# Patient Record
Sex: FEMALE | Race: AMERICAN INDIAN OR ALASKA NATIVE
[De-identification: names, ages, dates, MRNs, and addresses within clinical notes are randomized per-mention and may not be internally consistent; named-entity substitution may affect disease eponyms.]

---

## 2018-08-21 ENCOUNTER — HOSPITAL ENCOUNTER (OUTPATIENT)
Dept: HOSPITAL 88 - ER | Age: 68
Setting detail: OBSERVATION
LOS: 2 days | Discharge: HOME | End: 2018-08-23
Attending: INTERNAL MEDICINE | Admitting: INTERNAL MEDICINE
Payer: MEDICARE

## 2018-08-21 VITALS — SYSTOLIC BLOOD PRESSURE: 179 MMHG | DIASTOLIC BLOOD PRESSURE: 77 MMHG

## 2018-08-21 VITALS — BODY MASS INDEX: 29.03 KG/M2 | HEIGHT: 59 IN | WEIGHT: 144 LBS

## 2018-08-21 DIAGNOSIS — Z91.040: ICD-10-CM

## 2018-08-21 DIAGNOSIS — Z88.8: ICD-10-CM

## 2018-08-21 DIAGNOSIS — J91.0: ICD-10-CM

## 2018-08-21 DIAGNOSIS — E87.6: ICD-10-CM

## 2018-08-21 DIAGNOSIS — I10: ICD-10-CM

## 2018-08-21 DIAGNOSIS — Z88.5: ICD-10-CM

## 2018-08-21 DIAGNOSIS — J44.9: ICD-10-CM

## 2018-08-21 DIAGNOSIS — Z88.6: ICD-10-CM

## 2018-08-21 DIAGNOSIS — D63.8: ICD-10-CM

## 2018-08-21 DIAGNOSIS — C34.92: Primary | ICD-10-CM

## 2018-08-21 DIAGNOSIS — Z88.1: ICD-10-CM

## 2018-08-21 DIAGNOSIS — Z87.891: ICD-10-CM

## 2018-08-21 DIAGNOSIS — Z88.0: ICD-10-CM

## 2018-08-21 DIAGNOSIS — Z94.4: ICD-10-CM

## 2018-08-21 DIAGNOSIS — R06.00: ICD-10-CM

## 2018-08-21 LAB
ALBUMIN SERPL-MCNC: 2.2 G/DL (ref 3.5–5)
ALBUMIN/GLOB SERPL: 0.7 {RATIO} (ref 0.8–2)
ALP SERPL-CCNC: 98 IU/L (ref 40–150)
ALT SERPL-CCNC: 12 IU/L (ref 0–55)
ANION GAP SERPL CALC-SCNC: 14.5 MMOL/L (ref 8–16)
BASOPHILS # BLD AUTO: 0 10*3/UL (ref 0–0.1)
BASOPHILS NFR BLD AUTO: 0.1 % (ref 0–1)
BNP BLD-MCNC: 86.6 PG/ML (ref 0–100)
BUN SERPL-MCNC: 12 MG/DL (ref 7–26)
BUN/CREAT SERPL: 16 (ref 6–25)
CALCIUM SERPL-MCNC: 8.5 MG/DL (ref 8.4–10.2)
CHLORIDE SERPL-SCNC: 107 MMOL/L (ref 98–107)
CK MB SERPL-MCNC: 0.8 NG/ML (ref 0–5)
CK SERPL-CCNC: 31 IU/L (ref 29–168)
CO2 SERPL-SCNC: 22 MMOL/L (ref 22–29)
DEPRECATED APTT PLAS QN: 26.4 SECONDS (ref 23.8–35.5)
DEPRECATED INR PLAS: 1.03
DEPRECATED NEUTROPHILS # BLD AUTO: 7.7 10*3/UL (ref 2.1–6.9)
EGFRCR SERPLBLD CKD-EPI 2021: > 60 ML/MIN (ref 60–?)
EOSINOPHIL # BLD AUTO: 0.1 10*3/UL (ref 0–0.4)
EOSINOPHIL NFR BLD AUTO: 0.6 % (ref 0–6)
ERYTHROCYTE [DISTWIDTH] IN CORD BLOOD: 18.8 % (ref 11.7–14.4)
GLOBULIN PLAS-MCNC: 3.2 G/DL (ref 2.3–3.5)
GLUCOSE SERPLBLD-MCNC: 125 MG/DL (ref 74–118)
HCT VFR BLD AUTO: 33 % (ref 34.2–44.1)
HGB BLD-MCNC: 10.4 G/DL (ref 12–16)
LYMPHOCYTES # BLD: 0.9 10*3/UL (ref 1–3.2)
LYMPHOCYTES NFR BLD AUTO: 9.4 % (ref 18–39.1)
MAGNESIUM SERPL-MCNC: 1.5 MG/DL (ref 1.3–2.1)
MCH RBC QN AUTO: 22.4 PG (ref 28–32)
MCHC RBC AUTO-ENTMCNC: 31.5 G/DL (ref 31–35)
MCV RBC AUTO: 71.1 FL (ref 81–99)
MONOCYTES # BLD AUTO: 0.6 10*3/UL (ref 0.2–0.8)
MONOCYTES NFR BLD AUTO: 6.5 % (ref 4.4–11.3)
NEUTS SEG NFR BLD AUTO: 83 % (ref 38.7–80)
PLATELET # BLD AUTO: 320 X10E3/UL (ref 140–360)
POTASSIUM SERPL-SCNC: 3.5 MMOL/L (ref 3.5–5.1)
PROTHROMBIN TIME: 12.7 SECONDS (ref 11.9–14.5)
RBC # BLD AUTO: 4.64 X10E6/UL (ref 3.6–5.1)
SODIUM SERPL-SCNC: 140 MMOL/L (ref 136–145)

## 2018-08-21 PROCEDURE — 82553 CREATINE MB FRACTION: CPT

## 2018-08-21 PROCEDURE — 85730 THROMBOPLASTIN TIME PARTIAL: CPT

## 2018-08-21 PROCEDURE — 83880 ASSAY OF NATRIURETIC PEPTIDE: CPT

## 2018-08-21 PROCEDURE — 83615 LACTATE (LD) (LDH) ENZYME: CPT

## 2018-08-21 PROCEDURE — 84484 ASSAY OF TROPONIN QUANT: CPT

## 2018-08-21 PROCEDURE — 85610 PROTHROMBIN TIME: CPT

## 2018-08-21 PROCEDURE — 36415 COLL VENOUS BLD VENIPUNCTURE: CPT

## 2018-08-21 PROCEDURE — 88112 CYTOPATH CELL ENHANCE TECH: CPT

## 2018-08-21 PROCEDURE — 88305 TISSUE EXAM BY PATHOLOGIST: CPT

## 2018-08-21 PROCEDURE — 85025 COMPLETE CBC W/AUTO DIFF WBC: CPT

## 2018-08-21 PROCEDURE — 71045 X-RAY EXAM CHEST 1 VIEW: CPT

## 2018-08-21 PROCEDURE — 97116 GAIT TRAINING THERAPY: CPT

## 2018-08-21 PROCEDURE — 71046 X-RAY EXAM CHEST 2 VIEWS: CPT

## 2018-08-21 PROCEDURE — 93005 ELECTROCARDIOGRAM TRACING: CPT

## 2018-08-21 PROCEDURE — 83735 ASSAY OF MAGNESIUM: CPT

## 2018-08-21 PROCEDURE — 32555 ASPIRATE PLEURA W/ IMAGING: CPT

## 2018-08-21 PROCEDURE — 84157 ASSAY OF PROTEIN OTHER: CPT

## 2018-08-21 PROCEDURE — 83605 ASSAY OF LACTIC ACID: CPT

## 2018-08-21 PROCEDURE — 94640 AIRWAY INHALATION TREATMENT: CPT

## 2018-08-21 PROCEDURE — 99284 EMERGENCY DEPT VISIT MOD MDM: CPT

## 2018-08-21 PROCEDURE — 80053 COMPREHEN METABOLIC PANEL: CPT

## 2018-08-21 PROCEDURE — 89051 BODY FLUID CELL COUNT: CPT

## 2018-08-21 PROCEDURE — 82550 ASSAY OF CK (CPK): CPT

## 2018-08-21 PROCEDURE — 74470 X-RAY EXAM OF KIDNEY LESION: CPT

## 2018-08-21 PROCEDURE — 97161 PT EVAL LOW COMPLEX 20 MIN: CPT

## 2018-08-21 RX ADMIN — OXYCODONE HYDROCHLORIDE AND ACETAMINOPHEN PRN EACH: 5; 325 TABLET ORAL at 21:50

## 2018-08-21 NOTE — DIAGNOSTIC IMAGING REPORT
EXAMINATION: PA and lateral views of the chest.



COMPARISON: None



CLINICAL HISTORY:  Shortness of breath, history of lower lobectomy

     

DISCUSSION:



Lines/tubes:  Right upper chest Port-A-Cath, with the catheter tip projecting

in the distal SVC.. A radiopaque drainage catheter projects in the left lower

hemithorax



Lungs and pleura:  Lungs are well-inflated. There is layering opacification of

the left hemithorax, consistent with a large pleural effusion and likely

associated compressive atelectasis. The right lung is clear..



Heart and mediastinum:  Cardiac silhouette is obscured.  Right pulmonary

vasculature is normal.



Bones and soft tissues:  No acute bony abnormalities.  Degenerative changes in

the thoracic spine



IMPRESSION: 

Findings likely represent large pleural effusion and likely associated

compressive atelectasis. This may also represent post lobectomy pneumothorax or

chylothorax, if there is history of recent surgery











Signed by: Dr. Jeff Brownlee M.D. on 8/21/2018 4:28 PM

## 2018-08-22 VITALS — SYSTOLIC BLOOD PRESSURE: 180 MMHG | DIASTOLIC BLOOD PRESSURE: 77 MMHG

## 2018-08-22 VITALS — SYSTOLIC BLOOD PRESSURE: 170 MMHG | DIASTOLIC BLOOD PRESSURE: 77 MMHG

## 2018-08-22 VITALS — SYSTOLIC BLOOD PRESSURE: 192 MMHG | DIASTOLIC BLOOD PRESSURE: 75 MMHG

## 2018-08-22 VITALS — DIASTOLIC BLOOD PRESSURE: 76 MMHG | SYSTOLIC BLOOD PRESSURE: 145 MMHG

## 2018-08-22 VITALS — SYSTOLIC BLOOD PRESSURE: 145 MMHG | DIASTOLIC BLOOD PRESSURE: 76 MMHG

## 2018-08-22 VITALS — DIASTOLIC BLOOD PRESSURE: 66 MMHG | SYSTOLIC BLOOD PRESSURE: 145 MMHG

## 2018-08-22 VITALS — SYSTOLIC BLOOD PRESSURE: 187 MMHG | DIASTOLIC BLOOD PRESSURE: 83 MMHG

## 2018-08-22 VITALS — SYSTOLIC BLOOD PRESSURE: 170 MMHG | DIASTOLIC BLOOD PRESSURE: 74 MMHG

## 2018-08-22 LAB
APPEARANCE FLD: (no result)
COLOR FLD: YELLOW
DEPRECATED LYMPHOCYTES FR FLD MANUAL: 60 %
EOSINOPHIL NFR FLD: 2 %
MONOS+MACROS NFR FLD MANUAL: 7 %
NEUTROPHILS NFR FLD MANUAL: 11 %
OTHER CELLS FLD MANUAL: 20 %
SPECIMEN SOURCE FLD: (no result)
WBC # FLD MANUAL: 297 CELLS/UL

## 2018-08-22 RX ADMIN — OXYCODONE HYDROCHLORIDE AND ACETAMINOPHEN PRN EACH: 5; 325 TABLET ORAL at 10:10

## 2018-08-22 RX ADMIN — IPRATROPIUM BROMIDE AND ALBUTEROL SULFATE SCH ML: .5; 2.5 SOLUTION RESPIRATORY (INHALATION) at 13:00

## 2018-08-22 RX ADMIN — BUDESONIDE SCH ML: 0.5 SUSPENSION RESPIRATORY (INHALATION) at 19:00

## 2018-08-22 RX ADMIN — AMLODIPINE BESYLATE SCH MG: 5 TABLET ORAL at 08:49

## 2018-08-22 RX ADMIN — OXYCODONE HYDROCHLORIDE AND ACETAMINOPHEN PRN EACH: 5; 325 TABLET ORAL at 01:50

## 2018-08-22 RX ADMIN — IPRATROPIUM BROMIDE AND ALBUTEROL SULFATE SCH ML: .5; 2.5 SOLUTION RESPIRATORY (INHALATION) at 19:00

## 2018-08-22 RX ADMIN — METOPROLOL TARTRATE SCH MG: 25 TABLET, FILM COATED ORAL at 16:12

## 2018-08-22 RX ADMIN — Medication SCH MG: at 08:48

## 2018-08-22 RX ADMIN — OXYCODONE HYDROCHLORIDE AND ACETAMINOPHEN PRN EACH: 5; 325 TABLET ORAL at 15:50

## 2018-08-22 RX ADMIN — TACROLIMUS SCH MG: 1 CAPSULE, GELATIN COATED ORAL at 10:15

## 2018-08-22 RX ADMIN — METOPROLOL TARTRATE SCH MG: 25 TABLET, FILM COATED ORAL at 08:48

## 2018-08-22 RX ADMIN — OXYCODONE HYDROCHLORIDE AND ACETAMINOPHEN PRN EACH: 5; 325 TABLET ORAL at 20:10

## 2018-08-22 RX ADMIN — ASPIRIN 81 MG CHEWABLE TABLET SCH MG: 81 TABLET CHEWABLE at 08:48

## 2018-08-22 RX ADMIN — OXYCODONE HYDROCHLORIDE AND ACETAMINOPHEN PRN EACH: 5; 325 TABLET ORAL at 06:06

## 2018-08-22 NOTE — CONSULTATION
DATE OF CONSULTATION:  August 21, 2018 



PULMONARY CONSULTATION



REASON FOR THE CONSULT:  Large left pleural effusion. 



CHIEF COMPLAINT:  Shortness of breath.



HPI:  Ms. Mejias is a 67-year-old female.  She lives in [place], [place] and was diagnosed with lung cancer with malignant pleural 

effusion.  She has an indwelling pleural catheter placed; however, she 

forgot to bring the drainage kit with her.  She came to [place] to be 

treated at [facility] and she has already seen Dr. Conteh at [facility] for 

her lung cancer.  She reports that she underwent lobectomy and after 

lobectomy, she possibly had a recurrence and now there are cancer cells and 

pleural effusion according to her.  She denies any chest pain.  She was 

having shortness of breath in the emergency room.  Some of the pleural 

fluid was drained by the emergency room physician.  She denies any chest 

pain, nausea, vomiting.



REVIEW OF SYSTEMS

GENERAL:  Denies any fever, chills.  

HEAD:  Denies any head trauma.  

ENT:  Denies any earache.  

CVS:  Denies any chest pain.  

RESPIRATORY:  Shortness of breath. 

GI:  Denies any nausea, vomiting.  

REST OF THE REVIEW SYSTEMS:  Negative except as in HPI. 



PAST MEDICAL HISTORY

1. Status post liver transplant in 2015.  Patient has alcoholic liver 

cirrhosis.  She quit drinking for a year and underwent liver transplant. 

 She is on Prograf.  

2. Lung cancer, she has right-sided pleural effusion, which according to 

the patient is malignant and that has been tested.

3. Hypertension.



PAST SURGICAL HISTORY:  Cholecystectomy, appendectomy, incisional hernia 

surgery, liver transplant surgery, lobectomy, cholecystectomy.



FAMILY HISTORY AND SOCIAL HISTORY:  She quit smoking 3 months ago, smoked 

cigarettes since she was 14 years old.  She smoked for 50 years.  She quit 

drinking in 2014.  She he lives in [place], [place], and all her 

care was at [place], [place].  Now, she has moved to [place] to be 

treated at [facility]. 

 

PHYSICAL EXAM   

VITAL SIGNS:  Temperature 98.6, pulse of 85, blood pressure 167/82, he has 

respiratory rate of 18.  O2 sat 95%. 

HEENT:  Head atraumatic, normocephalic.  

NECK:  Supple.  

CHEST:  Clear to auscultation bilaterally.  Reduced air entry on the right 

side.  

HEART:  S1, S2 audible.  

ABDOMEN:  Soft, nontender. 

EXTREMITIES:  Pedal edema.  

NEUROLOGIC:  Awake and alert.  No focal neurologic deficit. 



LABS:  White count of 9000, hemoglobin 10.4, platelets 320,000.  Chemistry, 

sodium 140, potassium 3.5, chloride 107, BUN 12, creatinine 0.7.  Chest 

x-ray showing large right-sided pleural effusion.



ASSESSMENT AND PLAN:  Ms. Mejias is a 67-year-old female with lung cancer. 

 She was diagnosed with malignant pleural effusion and has a PleurX 

catheter; however, she forgot to bring her supplies and has came to the 

emergency room with shortness of breath.





CURRENT PROBLEMS

1. Lung cancer.

2. Malignant pleural effusion.

3. History of liver transplant.

4. Ex-smoker.

5. Chronic obstructive pulmonary disease. 



PLAN

1. We will put a case management consult to find a [employer] company to 

provide her with supplies of her PleurX catheter.  

2. Will start the patient on nebulizer treatment for her COPD.  

3. Currently, some fluid was drained by ER physician and patient is 

feeling comfortable.  If further drainage is needed and the catheter is 

not working, she will have to have conventional thoracentesis.  



Thank you for this consult.









DD:  08/21/2018 19:40

DT:  08/21/2018 20:47

Job#:  [id number]

## 2018-08-22 NOTE — HISTORY AND PHYSICAL
HPI:  She is a 67-year-old female with past medical history positive for 

adenocarcinoma of the lung stage IV, status post chemotherapy, status post 

partial lobectomy in the left lung, history of liver transplant secondary 

to cirrhosis, came here because she could not breathe.  She was found to 

have large pleural effusion.  She has a left pleural catheter.  She 

apparently has not brought the kit that comes with the catheter, so 

thoracentesis had to be done by Dr. Oliver.  Patient is feeling better 

right now.



REVIEW OF SYSTEMS

CARDIOVASCULAR:  No chest pain, no palpitation.

RESPIRATORY:  She has shortness of breath.  No cough.

GASTROINTESTINAL:  No nausea, no vomiting, no diarrhea.

GENITOURINARY:  No frequency, no dysuria.



ALLERGIES:  SHE IS ALLERGIC TO LATEX, LEVAQUIN, ZOFRAN, DILAUDID, TYLENOL, 

HYDROCODONE, CODEINE, AND PENICILLIN.



SOCIAL HISTORY:  She quit smoking 3 months ago.  She used to drink, she 

does not drink anymore.



PAST MEDICAL HISTORY:  Positive for adenocarcinoma of the left lung, status 

post lobectomy, status post chemotherapy, status post PleurX catheter 

placed in the left lung.  Also, she has history of hypertension.



PHYSICAL EXAMINATION

VITAL SIGNS:  Blood pressure 170/77, temperature 97.7, heart rate 87 per 

minute, respiratory rate 18 per minute, oxygen saturation 95%.

HEART:  Regular rhythm.  Normal S1, S2 sound.

LUNGS:  Show decreased breath sounds on the left lung.  Clear to 

auscultation in the right lung.

ABDOMEN:  Soft.

EXTREMITIES:  No evidence of cyanosis, edema, or trauma.



LABORATORY DATA:  On the BMP; sodium 140, potassium 3.5, chloride 107, CO2 

of 22, BUN 12, creatinine 0.74, glucose 125.  On the CBC; white blood count 

9.34, hemoglobin 10.4, hematocrit 33.0, platelet count 320,000.  PT 12.7 

and PTT 26.4, INR 1.03.  AST 14, ALT 12, total bilirubin 0.1, alkaline 

phosphatase 98.



FINAL IMPRESSION

1. Left pleural effusion secondary to adenocarcinoma of the lung.

2. Anemia of chronic disease.

3. Adenocarcinoma of the lung stage IV, status post pneumonectomy.

4. Hypokalemia.

5. Status post liver transplant.



PLAN OF TREATMENT:  Dr. Oliver, pulmonology, has been consulted on the 

case.  He has done thoracentesis.  Patient is trying to get the kit for the 

Pleur-evac that he has.  We are going to continue metoprolol 25 mg twice a 

day.  We are going to start lisinopril 20 mg daily, Norvasc 5 mg daily, 

hydralazine 20 mg q.4 hours as needed, albuterol and Atrovent q.4 hours as 

needed for shortness of breath, budesonide 1 inhalation twice a day, 

aspirin 81 mg daily, Norco 1 tablet q.4 hours as needed for pain, 

tacrolimus 1 mg at bedtime and 2 mg daily.  Patient will follow up at [facility].









DD:  08/22/2018 10:45

DT:  08/22/2018 11:00

Job#:  [id number]

## 2018-08-22 NOTE — DIAGNOSTIC IMAGING REPORT
PROCEDURE:   ULTRASOUND GUIDED LEFT DIAGNOSTIC AND THERAPEUTIC 

THORACENTESIS

 

INDICATIONS:      PLEURAL EFFUSIONS

 

COMPARISON:     Chest radiograph 8/21/18.

 

TECHNIQUE:

The patient was informed of the nature of the proposed procedure.  The 

purposes, alternatives, risks, and benefits were explained and 

discussed.  All questions were answered and written consent was 

obtained.

 

Medications:  1% Xylocaine 

 

DESCRIPTION/FINDINGS:  Informed consent was obtained from the patient.  

Sterile technique was used.  

 

Preliminary ultrasound demonstrated a moderate left pleural fluid 

collection.  After infiltrating the skin with 1% xylocaine, a 5 Fr 

catheter was advanced into the left pleural space with ultrasound 

guidance and 350 cc of serous fluid was removed.  The catheter was 

removed without immediate complication. Sterile bandage was placed. A 

chest radiograph was ordered.

 

Samples were sent for analysis.

 

IMPRESSION:

 

Ultrasound guided left sided diagnostic and therapeutic thoracentesis. 

 

Dictated by:  BOBBY ROSARIO M.D. on 8/22/2018 at 12:19     

Electronically approved by:  BOBBY ROSARIO M.D. on 8/22/2018 at 12:19

## 2018-08-22 NOTE — DIAGNOSTIC IMAGING REPORT
This report includes an Addendum and supersedes previous reports for 

this exam.

 

 

 

PROCEDURE: CHEST XRAY POST PROCEDURE

COMPARISON: Chest radiograph 8/21/18. 

INDICATIONS: POST THORACENTESIS

 

FINDINGS:



Lines/tubes: Right upper chest Port-A-Cath, with the catheter tip 

projecting

in the distal SVC. A PleurX catheter projects over the left base. 

 

Lungs and pleura: Lungs are well-inflated. Interval decrease in size of 

a left sided pleural effusion, now small. There is opacification of the 

left hemithorax with prominence of the left hilum. There is thickening 

of the left pleura. The right lung is clear without evidence of 

pneumonia or pulmonary edema.

 

Heart and mediastinum: Cardiac silhouette is obscured. Right pulmonary

vasculature is normal.

 

Bones and soft tissues: No acute bony abnormalities. Degenerative 

changes in

the thoracic spine

 

IMPRESSION:

Interval thoracentesis on the left with decreased left pleural effusion 

and decreased compressive atelectasis. No evidence of pneumothorax. 

 

Opacification of the left hemithorax with prominence of the left hilum 

and pleural thickening, in this patient with known lung malignancy. 

Correlation with prior outside imaging would be helpful for comparison. 

 

 

Dictated by:  BOBBY ROSARIO M.D. on 8/22/2018 at 12:17     

Electronically approved by:  BOBBY ROSARIO M.D. on 8/22/2018 at 12:17   

 

 

ADDENDUM: 

 

No evidence of pneumothorax post thoracentesis. 

 

Dictated by:  BOBBY ROSARIO M.D. on 8/22/2018 at 12:20     

Electronically approved by:  BOBBY ROSARIO M.D. on 8/22/2018 at 12:20

## 2018-08-23 VITALS — DIASTOLIC BLOOD PRESSURE: 65 MMHG | SYSTOLIC BLOOD PRESSURE: 133 MMHG

## 2018-08-23 VITALS — SYSTOLIC BLOOD PRESSURE: 125 MMHG | DIASTOLIC BLOOD PRESSURE: 63 MMHG

## 2018-08-23 VITALS — DIASTOLIC BLOOD PRESSURE: 76 MMHG | SYSTOLIC BLOOD PRESSURE: 175 MMHG

## 2018-08-23 RX ADMIN — METOPROLOL TARTRATE SCH MG: 25 TABLET, FILM COATED ORAL at 08:16

## 2018-08-23 RX ADMIN — BUDESONIDE SCH ML: 0.5 SUSPENSION RESPIRATORY (INHALATION) at 07:00

## 2018-08-23 RX ADMIN — OXYCODONE HYDROCHLORIDE AND ACETAMINOPHEN PRN EACH: 5; 325 TABLET ORAL at 08:45

## 2018-08-23 RX ADMIN — ASPIRIN 81 MG CHEWABLE TABLET SCH MG: 81 TABLET CHEWABLE at 08:16

## 2018-08-23 RX ADMIN — TACROLIMUS SCH MG: 1 CAPSULE, GELATIN COATED ORAL at 08:17

## 2018-08-23 RX ADMIN — IPRATROPIUM BROMIDE AND ALBUTEROL SULFATE SCH ML: .5; 2.5 SOLUTION RESPIRATORY (INHALATION) at 01:00

## 2018-08-23 RX ADMIN — Medication SCH MG: at 08:16

## 2018-08-23 RX ADMIN — HYDRALAZINE HYDROCHLORIDE PRN MG: 20 INJECTION INTRAMUSCULAR; INTRAVENOUS at 02:23

## 2018-08-23 RX ADMIN — OXYCODONE HYDROCHLORIDE AND ACETAMINOPHEN PRN EACH: 5; 325 TABLET ORAL at 00:03

## 2018-08-23 RX ADMIN — HYDRALAZINE HYDROCHLORIDE PRN MG: 20 INJECTION INTRAMUSCULAR; INTRAVENOUS at 04:16

## 2018-08-23 RX ADMIN — AMLODIPINE BESYLATE SCH MG: 5 TABLET ORAL at 08:16

## 2018-08-23 RX ADMIN — OXYCODONE HYDROCHLORIDE AND ACETAMINOPHEN PRN EACH: 5; 325 TABLET ORAL at 04:30

## 2018-08-23 RX ADMIN — IPRATROPIUM BROMIDE AND ALBUTEROL SULFATE SCH ML: .5; 2.5 SOLUTION RESPIRATORY (INHALATION) at 07:00

## 2018-08-23 NOTE — PROGRESS NOTE
DATE:    



The patient is feeling better.  Last night, blood pressure was high.  

Hydralazine was given.  Status post thoracentesis yesterday.



PHYSICAL EXAMINATION

VITAL SINGS:  Temperature 96.8, pulse of 73, blood pressure 125/63, 

respiratory rate of 18, and O2 sat 95%.

HEENT:  Atraumatic, normocephalic.

NECK:  Supple.

CHEST:  Better air entry bilaterally.

HEART:  S1 and S2 audible.

ABDOMEN:  Soft.

EXTREMITIES:  No pedal edema.

NEUROLOGICAL: Awake and alert.



LABS:  Sodium 140, potassium 3.5, chloride 107, BUN 12, and creatinine 0.7, 

this was on August 21, 2018.  No new labs.



ASSESSMENT/PLAN:  Ms. Mejias is a 67-year-old female who has large pleural 

effusion status post thoracentesis.  Patient has indwelling pleural 

catheter, also has history of stage 4 adenocarcinoma of the lung.





CURRENT PROBLEM

1. Adenocarcinoma of the lung.  Dr. Che just called me the patient's 

pleural fluid is positive for malignant cell, hence stage 4.  Patient is 

following up at [facility].  Status post liver transplant program has 

been started.

2. Hypertension.  Continue the patient on antihypertensive medications.

3. Ex-smoker, quit 3 months ago, likely has COPD.  I have started the 

patient on Pulmicort nebulizer treatment and she will follow up with me 

as an outpatient.

4. Discussed with RN and discussed with case management.  Patient will 

get the supplies for her indwelling pleural catheter to do pleural fluid 

drainage at home.









DD:  08/23/2018 16:02

DT:  08/23/2018 16:14

Job#:  [id number]

## 2018-08-27 ENCOUNTER — HOSPITAL ENCOUNTER (INPATIENT)
Dept: HOSPITAL 88 - ER | Age: 68
LOS: 2 days | Discharge: HOME | DRG: 180 | End: 2018-08-29
Attending: INTERNAL MEDICINE | Admitting: INTERNAL MEDICINE
Payer: MEDICARE

## 2018-08-27 VITALS — WEIGHT: 139.06 LBS | HEIGHT: 59 IN | BODY MASS INDEX: 28.04 KG/M2

## 2018-08-27 DIAGNOSIS — R09.02: ICD-10-CM

## 2018-08-27 DIAGNOSIS — Z94.4: ICD-10-CM

## 2018-08-27 DIAGNOSIS — D63.8: ICD-10-CM

## 2018-08-27 DIAGNOSIS — J91.0: ICD-10-CM

## 2018-08-27 DIAGNOSIS — Z87.891: ICD-10-CM

## 2018-08-27 DIAGNOSIS — J18.9: ICD-10-CM

## 2018-08-27 DIAGNOSIS — C34.92: Primary | ICD-10-CM

## 2018-08-27 DIAGNOSIS — R18.8: ICD-10-CM

## 2018-08-27 LAB
ALBUMIN SERPL-MCNC: 2.2 G/DL (ref 3.5–5)
ALBUMIN/GLOB SERPL: 0.7 {RATIO} (ref 0.8–2)
ALP SERPL-CCNC: 93 IU/L (ref 40–150)
ALT SERPL-CCNC: 9 IU/L (ref 0–55)
ANION GAP SERPL CALC-SCNC: 12.9 MMOL/L (ref 8–16)
APPEARANCE FLD: (no result)
BASOPHILS # BLD AUTO: 0 10*3/UL (ref 0–0.1)
BASOPHILS NFR BLD AUTO: 0.4 % (ref 0–1)
BUN SERPL-MCNC: 18 MG/DL (ref 7–26)
BUN/CREAT SERPL: 20 (ref 6–25)
CALCIUM SERPL-MCNC: 8.6 MG/DL (ref 8.4–10.2)
CHLORIDE SERPL-SCNC: 106 MMOL/L (ref 98–107)
CO2 SERPL-SCNC: 22 MMOL/L (ref 22–29)
COLOR FLD: (no result)
DEPRECATED LYMPHOCYTES FR FLD MANUAL: 68 %
DEPRECATED NEUTROPHILS # BLD AUTO: 6.3 10*3/UL (ref 2.1–6.9)
EGFRCR SERPLBLD CKD-EPI 2021: > 60 ML/MIN (ref 60–?)
EOSINOPHIL # BLD AUTO: 0.1 10*3/UL (ref 0–0.4)
EOSINOPHIL NFR BLD AUTO: 1 % (ref 0–6)
ERYTHROCYTE [DISTWIDTH] IN CORD BLOOD: 18.6 % (ref 11.7–14.4)
GLOBULIN PLAS-MCNC: 3 G/DL (ref 2.3–3.5)
GLUCOSE FLD-MCNC: 111 MG/DL
GLUCOSE SERPLBLD-MCNC: 115 MG/DL (ref 74–118)
HCT VFR BLD AUTO: 32 % (ref 34.2–44.1)
HGB BLD-MCNC: 9.9 G/DL (ref 12–16)
LYMPHOCYTES # BLD: 1.1 10*3/UL (ref 1–3.2)
LYMPHOCYTES NFR BLD AUTO: 13.6 % (ref 18–39.1)
MCH RBC QN AUTO: 22.3 PG (ref 28–32)
MCHC RBC AUTO-ENTMCNC: 30.9 G/DL (ref 31–35)
MCV RBC AUTO: 72.2 FL (ref 81–99)
MONOCYTES # BLD AUTO: 0.7 10*3/UL (ref 0.2–0.8)
MONOCYTES NFR BLD AUTO: 8.7 % (ref 4.4–11.3)
MONOS+MACROS NFR FLD MANUAL: 25 %
NEUTROPHILS NFR FLD MANUAL: 7 %
NEUTS SEG NFR BLD AUTO: 75.8 % (ref 38.7–80)
PLATELET # BLD AUTO: 437 X10E3/UL (ref 140–360)
POTASSIUM SERPL-SCNC: 3.9 MMOL/L (ref 3.5–5.1)
RBC # BLD AUTO: 4.43 X10E6/UL (ref 3.6–5.1)
SODIUM SERPL-SCNC: 137 MMOL/L (ref 136–145)
SPECIMEN SOURCE FLD: (no result)
WBC # FLD MANUAL: 45 CELLS/UL

## 2018-08-27 PROCEDURE — 0W9B3ZZ DRAINAGE OF LEFT PLEURAL CAVITY, PERCUTANEOUS APPROACH: ICD-10-PCS | Performed by: EMERGENCY MEDICINE

## 2018-08-27 PROCEDURE — 99284 EMERGENCY DEPT VISIT MOD MDM: CPT

## 2018-08-27 PROCEDURE — 85025 COMPLETE CBC W/AUTO DIFF WBC: CPT

## 2018-08-27 PROCEDURE — 87070 CULTURE OTHR SPECIMN AEROBIC: CPT

## 2018-08-27 PROCEDURE — 71046 X-RAY EXAM CHEST 2 VIEWS: CPT

## 2018-08-27 PROCEDURE — 80053 COMPREHEN METABOLIC PANEL: CPT

## 2018-08-27 PROCEDURE — 89051 BODY FLUID CELL COUNT: CPT

## 2018-08-27 PROCEDURE — 87205 SMEAR GRAM STAIN: CPT

## 2018-08-27 PROCEDURE — 36415 COLL VENOUS BLD VENIPUNCTURE: CPT

## 2018-08-27 PROCEDURE — 83615 LACTATE (LD) (LDH) ENZYME: CPT

## 2018-08-27 PROCEDURE — 71260 CT THORAX DX C+: CPT

## 2018-08-27 PROCEDURE — 82945 GLUCOSE OTHER FLUID: CPT

## 2018-08-27 PROCEDURE — 84157 ASSAY OF PROTEIN OTHER: CPT

## 2018-08-27 PROCEDURE — 84484 ASSAY OF TROPONIN QUANT: CPT

## 2018-08-27 PROCEDURE — 82948 REAGENT STRIP/BLOOD GLUCOSE: CPT

## 2018-08-27 PROCEDURE — 93005 ELECTROCARDIOGRAM TRACING: CPT

## 2018-08-27 RX ADMIN — VANCOMYCIN HYDROCHLORIDE SCH MLS/HR: 1 INJECTION, SOLUTION INTRAVENOUS at 18:35

## 2018-08-27 RX ADMIN — AZTREONAM SCH MLS/HR: 1 INJECTION, SOLUTION INTRAVENOUS at 22:03

## 2018-08-27 RX ADMIN — FAMOTIDINE SCH MG: 10 INJECTION, SOLUTION INTRAVENOUS at 18:33

## 2018-08-27 RX ADMIN — Medication PRN MG: at 21:29

## 2018-08-27 RX ADMIN — TACROLIMUS SCH MG: 1 CAPSULE, GELATIN COATED ORAL at 21:22

## 2018-08-27 NOTE — HISTORY AND PHYSICAL
CHIEF COMPLAINT:  Left chest pain associated with a large pleural effusion 

with infiltrative left lung mass encasing the left hilar vessel on the CT 

scan, associated with a complete occlusion of the left upper lobe pulmonary 

artery as well as the airway, significant pleural effusion with a complete 

whiteout of the left lung on CT scan of the chest.



HISTORY OF PRESENT ILLNESS:  The patient is a pleasant but unfortunate 

67-year-old female diagnosed with lung cancer, adenocarcinoma stage IV with 

large left pleural effusion.  She has chest drain in place.  She was just 

recently here at [facility] on August 22, 2018, and 

the patient was subsequently discharged home.  She came back in within a 

week with increasing left chest discomfort, increasing shortness of breath. 

 CT scan showed whiteout of the left lung.  The patient is now status post 

a left lung drain and significant amount of fluid obtained, approximately 

900 mL, but more importantly it is bloody fluid.  The patient is more 

comfortable now after the drainage.  She had lab work done.  She is anemic. 

 Hemoglobin and hematocrit 9.19 and 32 respectively.  The patient is 

stable.  She does have a low-grade fever.  The patient is pending for seen 

by her pulmonologist here, who previously is Dr. Oliver.  The patient is 

stable at this time.



PAST MEDICAL HISTORY:  Stage IV lung cancer with a left lung mass and also 

recurrent pleural effusion.  The patient has drainage in place.  She did 

receive chemotherapy 1 dose back in March in [place] where she lives.  

She had 1 appointment with [facility] last Monday and her appointment with 

a pulmonologist there next week.



SOCIAL HISTORY:  Patient was a smoker.  She quit.  She denied alcohol 

usage.  No recreational drug use.



ALLERGIES:  TO PENICILLIN, CODEINE, HYDROCODONE, HYDROMORPHONE, LATEX, 

LEVOFLOXACIN AND ONDANSETRON.



HOME MEDICATIONS:  Aspirin, gabapentin, metoprolol, omeprazole, Percocet, 

Prograf.



PHYSICAL EXAMINATION:  

VITAL SIGNS:  Temperature is 98.  Blood pressure 120/62.  Pulse rate is 69. 

 Respirations 22. 

GENERAL:  The patient is not in acute distress.  She is awake.  She was in 

pain but much improved now. 

HEENT:  Normocephalic, atraumatic, anicteric. 

NECK:  Supple grossly.  

PULMONARY:  Bilateral diminished breath sounds, more on the left compared 

to the right.  A left lung drain in place.

EXTREMITIES:  No cyanosis or edema. 

NEUROLOGIC:  No gross focal deficit. 



LABORATORY:  WBC is 8, hemoglobin 9.9, hematocrit 32 and platelets are 437. 

 Chemistry:  Sodium 137, potassium 3.9, chloride 106, bicarb 22, BUN 18, 

creatinine 0.9, glucose is 115.



CT scan as mentioned.



IMPRESSION:  

1. Significant, severe left lung pleural effusion with a complete 

whiteout status post drainage, approximately 900 mL of body fluid.

2. Lung cancer, adenocarcinoma type.

3. Stage IV lung cancer as mentioned.

4. Possible imposing pneumonia.



PLAN:  IV antibiotics, vancomycin and Azactam for now.  Continue with home 

medications.  Consultation with Dr. Oliver.  Repeat the x-ray.  Repeated 

lab workup.  Will monitor the patient closely.  Pain control.







DD:  08/27/2018 17:27

DT:  08/27/2018 17:36

Job#:  [id number]

## 2018-08-27 NOTE — DIAGNOSTIC IMAGING REPORT
PROCEDURE:

Frontal and lateral views of the chest.

 

COMPARISON: 8/22/18

 

INDICATIONS:   CHEST PAIN/SHORTNESS OF BREATH

     

FINDINGS:

Lines/tubes:  Stable right chest wall port. A PleurX catheter projects 

over the left base.

 

Lungs and pleura:  The lungs are well inflated. Unchanged left pleural 

thickening. Increased left lung opacification. Right lung is 

unremarkable. 

 

Heart and mediastinum:  The heart and the mediastinum are normal.

 

Bones:  No acute bony abnormality.

 

IMPRESSION: 

 

Near complete opacification of the left lung, likely due to increased 

pleural effusion when compared to the prior x-ray. Underlying 

pneumonia/atelectasis cannot be excluded.

 

Dictated by:  Chencho Busby M.D. on 8/27/2018 at 11:33     

Electronically approved by:  Chencho Busby M.D. on 8/27/2018 at 11:33

## 2018-08-28 VITALS — DIASTOLIC BLOOD PRESSURE: 77 MMHG | SYSTOLIC BLOOD PRESSURE: 163 MMHG

## 2018-08-28 VITALS — SYSTOLIC BLOOD PRESSURE: 163 MMHG | DIASTOLIC BLOOD PRESSURE: 77 MMHG

## 2018-08-28 VITALS — SYSTOLIC BLOOD PRESSURE: 132 MMHG | DIASTOLIC BLOOD PRESSURE: 58 MMHG

## 2018-08-28 VITALS — DIASTOLIC BLOOD PRESSURE: 80 MMHG | SYSTOLIC BLOOD PRESSURE: 146 MMHG

## 2018-08-28 LAB
ALBUMIN SERPL-MCNC: 2.2 G/DL (ref 3.5–5)
ALBUMIN/GLOB SERPL: 0.7 {RATIO} (ref 0.8–2)
ALP SERPL-CCNC: 98 IU/L (ref 40–150)
ALT SERPL-CCNC: 9 IU/L (ref 0–55)
ANION GAP SERPL CALC-SCNC: 14 MMOL/L (ref 8–16)
BASOPHILS # BLD AUTO: 0 10*3/UL (ref 0–0.1)
BASOPHILS NFR BLD AUTO: 0.3 % (ref 0–1)
BUN SERPL-MCNC: 16 MG/DL (ref 7–26)
BUN/CREAT SERPL: 22 (ref 6–25)
CALCIUM SERPL-MCNC: 8.7 MG/DL (ref 8.4–10.2)
CHLORIDE SERPL-SCNC: 107 MMOL/L (ref 98–107)
CO2 SERPL-SCNC: 23 MMOL/L (ref 22–29)
DEPRECATED NEUTROPHILS # BLD AUTO: 6 10*3/UL (ref 2.1–6.9)
EGFRCR SERPLBLD CKD-EPI 2021: > 60 ML/MIN (ref 60–?)
EOSINOPHIL # BLD AUTO: 0 10*3/UL (ref 0–0.4)
EOSINOPHIL NFR BLD AUTO: 0.5 % (ref 0–6)
ERYTHROCYTE [DISTWIDTH] IN CORD BLOOD: 18.6 % (ref 11.7–14.4)
GLOBULIN PLAS-MCNC: 3.1 G/DL (ref 2.3–3.5)
GLUCOSE SERPLBLD-MCNC: 108 MG/DL (ref 74–118)
HCT VFR BLD AUTO: 33.5 % (ref 34.2–44.1)
HGB BLD-MCNC: 10.4 G/DL (ref 12–16)
LYMPHOCYTES # BLD: 1 10*3/UL (ref 1–3.2)
LYMPHOCYTES NFR BLD AUTO: 12.5 % (ref 18–39.1)
MCH RBC QN AUTO: 22.3 PG (ref 28–32)
MCHC RBC AUTO-ENTMCNC: 31 G/DL (ref 31–35)
MCV RBC AUTO: 71.9 FL (ref 81–99)
MONOCYTES # BLD AUTO: 0.5 10*3/UL (ref 0.2–0.8)
MONOCYTES NFR BLD AUTO: 6.9 % (ref 4.4–11.3)
NEUTS SEG NFR BLD AUTO: 79.1 % (ref 38.7–80)
PLATELET # BLD AUTO: 438 X10E3/UL (ref 140–360)
POTASSIUM SERPL-SCNC: 4 MMOL/L (ref 3.5–5.1)
RBC # BLD AUTO: 4.66 X10E6/UL (ref 3.6–5.1)
SODIUM SERPL-SCNC: 140 MMOL/L (ref 136–145)

## 2018-08-28 RX ADMIN — OXYCODONE HYDROCHLORIDE AND ACETAMINOPHEN PRN EACH: 5; 325 TABLET ORAL at 01:39

## 2018-08-28 RX ADMIN — AZTREONAM SCH MLS/HR: 1 INJECTION, SOLUTION INTRAVENOUS at 09:20

## 2018-08-28 RX ADMIN — OXYCODONE HYDROCHLORIDE AND ACETAMINOPHEN PRN EACH: 5; 325 TABLET ORAL at 10:03

## 2018-08-28 RX ADMIN — TACROLIMUS SCH MG: 1 CAPSULE, GELATIN COATED ORAL at 20:50

## 2018-08-28 RX ADMIN — VANCOMYCIN HYDROCHLORIDE SCH MLS/HR: 1 INJECTION, SOLUTION INTRAVENOUS at 17:58

## 2018-08-28 RX ADMIN — Medication SCH MG: at 09:09

## 2018-08-28 RX ADMIN — FAMOTIDINE SCH MG: 10 INJECTION, SOLUTION INTRAVENOUS at 17:00

## 2018-08-28 RX ADMIN — OXYCODONE HYDROCHLORIDE AND ACETAMINOPHEN PRN EACH: 5; 325 TABLET ORAL at 18:24

## 2018-08-28 RX ADMIN — AZTREONAM SCH MLS/HR: 1 INJECTION, SOLUTION INTRAVENOUS at 21:30

## 2018-08-28 RX ADMIN — OXYCODONE HYDROCHLORIDE AND ACETAMINOPHEN PRN EACH: 5; 325 TABLET ORAL at 14:20

## 2018-08-28 RX ADMIN — TACROLIMUS SCH MG: 1 CAPSULE, GELATIN COATED ORAL at 09:08

## 2018-08-28 RX ADMIN — ALPRAZOLAM PRN MG: 0.25 TABLET ORAL at 10:24

## 2018-08-28 RX ADMIN — Medication PRN MG: at 21:41

## 2018-08-28 RX ADMIN — VANCOMYCIN HYDROCHLORIDE SCH MLS/HR: 1 INJECTION, SOLUTION INTRAVENOUS at 06:49

## 2018-08-28 RX ADMIN — ALPRAZOLAM PRN MG: 0.25 TABLET ORAL at 20:51

## 2018-08-28 RX ADMIN — METOPROLOL TARTRATE SCH MG: 25 TABLET, FILM COATED ORAL at 17:58

## 2018-08-28 RX ADMIN — OXYCODONE HYDROCHLORIDE AND ACETAMINOPHEN PRN EACH: 5; 325 TABLET ORAL at 06:29

## 2018-08-28 RX ADMIN — METOPROLOL TARTRATE SCH MG: 25 TABLET, FILM COATED ORAL at 09:08

## 2018-08-28 RX ADMIN — OXYCODONE HYDROCHLORIDE AND ACETAMINOPHEN PRN EACH: 5; 325 TABLET ORAL at 23:22

## 2018-08-28 NOTE — CONSULTATION
DATE OF CONSULTATION:    



PULMONARY CONSULTATION 



REASON FOR CONSULTATION:  Lung cancer, shortness of breath.  



HPI:  Ms. Mejias is a 67-year-old female.  She is known to me from last 

admission.  She presented last time with the complaint of shortness of 

breath.  She has stage-IV lung cancer.  Her pleural effusion last time was 

positive for malignant cells.  This time, she came in again with shortness 

of breath.  A CT of the chest was done.  Pleural fluid was drained with an 

indwelling pleural catheter.  She has a large mass encasing the hilum and 

encasing the trachea, and it is involving the left lung almost completely.  

She is denying any nausea or vomiting.



REVIEW OF SYSTEMS

GENERAL:  Denies any fever or chills.  

HEAD:  Denies any head trauma.  

ENT:  Denies any earache.  

CVS:  Denies any chest pain.  

RESPIRATORY:  Shortness of breath.  

OTHER:  The rest of the review systems are negative except as in HPI.



PAST MEDICAL HISTORY:  Patient has history of liver transplant in 2015 on 

Prograf.  Lung cancer with right-sided pleural effusion, stage IV.  The 

last time, the pleural effusion was positive for malignant cells.  

Hypertension.



SURGICAL HISTORY:  Cholecystectomy, appendectomy, incisional hernia, liver 

transplant surgery.  Lobectomy of the lung as initially it was stage I, and 

it is a recurrence of cancer.  Cholecystectomy.



FAMILY HISTORY AND SOCIAL HISTORY:  She quit smoking 3 months ago.  Smoked 

cigarettes since she was 14 years old.  She lives in [place], [place], and all of her care was in [place].  Now she has moved to 

[place] to be treated at [facility].

 

PHYSICAL EXAMINATION

VITAL SIGNS:  Temperature 98.5, pulse of 75, blood pressure 153/83, 

respiratory rate 18.

HEENT:  Head is atraumatic, normocephalic.  

NECK:  Supple. 

CHEST:  Markedly reduced air entry and reduced air entry on the left side, 

otherwise clear.

HEART:  S1, S2 audible. 

ABDOMEN:  Soft, nontender, nondistended. 

EXTREMITIES:  No pedal edema.  

NEUROLOGIC:  Awake, alert.  No focal neurological deficit. 



LABS:  White count of 7.6, hemoglobin 10.4, platelets 438.  Chemistry is 

within normal limits. INR is 1.03.



ASSESSMENT AND PLAN

1. A 67-year-old female with shortness of breath, stage-IV extensive 

lung cancer involving the left lung.  Patient wants her care to be done 

at [facility].  Recommended the patient should go to [facility] as 

soon as possible for followup.  The symptoms are likely due to spread 

and advancement of the malignancy and not likely due to pleural effusion 

as it has been drained and there is minimal left.

2. Status post liver transplant.  Patient should be continued on 

Prograf.

3. Agree with IV antibiotics to treat for postobstructive pneumonia.  



4. I will discuss the case with Dr. Isbell if she can be sent out for her 

appointment with [facility].  She told me that it is tomorrow.  









DD:  08/28/2018 10:25

DT:  08/28/2018 11:03

Job#:  [id number]

## 2018-08-28 NOTE — DIAGNOSTIC IMAGING REPORT
EXAM: CHEST 2 VIEWS, PA and lateral

INDICATION: Shortness of breath, lung cancer

COMPARISON: PA and lateral view of the chest August 27, 2018



FINDINGS:

LINES/TUBES: Stable right internal jugular vein tunneled chest port. Left

basilar pleural drain. Left upper quadrant coils.



LUNGS: Stable pleural effusion. Surgical sutures left medial upper lung.



PLEURA: No effusions or pneumothorax.



HEART AND MEDIASTINUM: Obscured



BONES AND SOFT TISSUES: No acute findings. 



IMPRESSION:

Stable appearance of the chest with near complete opacification of the left

lung.







Signed by: Dr. Melody Trent M.D. on 8/28/2018 3:41 AM

## 2018-08-29 VITALS — SYSTOLIC BLOOD PRESSURE: 169 MMHG | DIASTOLIC BLOOD PRESSURE: 75 MMHG

## 2018-08-29 VITALS — SYSTOLIC BLOOD PRESSURE: 154 MMHG | DIASTOLIC BLOOD PRESSURE: 86 MMHG

## 2018-08-29 VITALS — SYSTOLIC BLOOD PRESSURE: 184 MMHG | DIASTOLIC BLOOD PRESSURE: 87 MMHG

## 2018-08-29 VITALS — SYSTOLIC BLOOD PRESSURE: 189 MMHG | DIASTOLIC BLOOD PRESSURE: 81 MMHG

## 2018-08-29 RX ADMIN — VANCOMYCIN HYDROCHLORIDE SCH MLS/HR: 1 INJECTION, SOLUTION INTRAVENOUS at 05:26

## 2018-08-29 RX ADMIN — OXYCODONE HYDROCHLORIDE AND ACETAMINOPHEN PRN EACH: 5; 325 TABLET ORAL at 05:26

## 2018-08-29 RX ADMIN — FAMOTIDINE SCH MG: 10 INJECTION, SOLUTION INTRAVENOUS at 08:13

## 2018-08-29 RX ADMIN — METOPROLOL TARTRATE SCH MG: 25 TABLET, FILM COATED ORAL at 08:14

## 2018-08-29 RX ADMIN — TACROLIMUS SCH MG: 1 CAPSULE, GELATIN COATED ORAL at 08:14

## 2018-08-29 RX ADMIN — Medication SCH MG: at 08:13

## 2018-08-29 RX ADMIN — AZTREONAM SCH MLS/HR: 1 INJECTION, SOLUTION INTRAVENOUS at 08:13

## 2018-08-29 RX ADMIN — OXYCODONE HYDROCHLORIDE AND ACETAMINOPHEN PRN EACH: 5; 325 TABLET ORAL at 09:39

## 2018-08-29 NOTE — DISCHARGE SUMMARY
FINAL DIAGNOSES:  

1. Malignant lung cancer stage IV associated with left pleural effusion.

2. Malignant pleural effusions status post thoracentesis.

3. Acute on chronic hypoxia.

4. Possible developing pneumonia.



SUMMARY:  Patient is a 67-year-old female from [place].  The patient 

came to the hospital for increasing shortness of breath and hypoxia.  

Patient had a left thoracentesis.  The fluid showed a bloody fluid.  

Patient felt a little bit better, but fluid reaccumulated fast.  The 

patient does have a pulmonary port in place where fluid can be drained.



The patient has an appointment with [facility] this morning.  The patient 

was discharged to go to [facility] today.  The patient is otherwise 

stable.



She will resume her home medication upon discharge.









DD:  08/29/2018 14:57

DT:  08/29/2018 15:24

Job#:  [id number]